# Patient Record
Sex: MALE | Race: WHITE | NOT HISPANIC OR LATINO | Employment: OTHER | URBAN - METROPOLITAN AREA
[De-identification: names, ages, dates, MRNs, and addresses within clinical notes are randomized per-mention and may not be internally consistent; named-entity substitution may affect disease eponyms.]

---

## 2017-01-23 PROBLEM — R00.2 PALPITATIONS: Status: ACTIVE | Noted: 2017-01-23

## 2017-01-23 PROBLEM — I34.1 MITRAL VALVE PROLAPSE: Status: ACTIVE | Noted: 2017-01-23

## 2018-07-25 ENCOUNTER — TELEPHONE (OUTPATIENT)
Dept: ADMINISTRATIVE | Facility: CLINIC | Age: 33
End: 2018-07-25

## 2018-07-25 NOTE — TELEPHONE ENCOUNTER
Home Health Recert 07/02/2018 to 08/30/2018 with [facility] , Dr Darío Duarte.  service.

## 2018-09-18 ENCOUNTER — TELEPHONE (OUTPATIENT)
Dept: ADMINISTRATIVE | Facility: CLINIC | Age: 33
End: 2018-09-18

## 2018-09-18 NOTE — TELEPHONE ENCOUNTER
Home Health Recert with [facility]-Dr. Darío Duarte. Pt received  services.

## 2018-10-30 ENCOUNTER — TELEPHONE (OUTPATIENT)
Dept: ADMINISTRATIVE | Facility: CLINIC | Age: 33
End: 2018-10-30

## 2019-01-03 ENCOUNTER — TELEPHONE (OUTPATIENT)
Dept: ADMINISTRATIVE | Facility: CLINIC | Age: 34
End: 2019-01-03

## 2019-01-03 NOTE — PROGRESS NOTES
HH re certification with [facility], Dr. Darío Duarte. Patient received SN services.

## 2019-07-09 ENCOUNTER — EXTERNAL HOME HEALTH (OUTPATIENT)
Dept: HOME HEALTH SERVICES | Facility: HOSPITAL | Age: 34
End: 2019-07-09

## 2019-07-23 ENCOUNTER — TELEPHONE (OUTPATIENT)
Dept: HEMATOLOGY/ONCOLOGY | Facility: CLINIC | Age: 34
End: 2019-07-23

## 2019-07-24 ENCOUNTER — TELEPHONE (OUTPATIENT)
Dept: HEMATOLOGY/ONCOLOGY | Facility: CLINIC | Age: 34
End: 2019-07-24

## 2019-07-25 ENCOUNTER — TELEPHONE (OUTPATIENT)
Dept: HEMATOLOGY/ONCOLOGY | Facility: CLINIC | Age: 34
End: 2019-07-25

## 2019-07-25 NOTE — TELEPHONE ENCOUNTER
Informed pt mother of appt in [facility] on 9/9. Pt mother states pt needs to be seen sooner, and they would prefer to be seen in [place]. New appt made, mother confirmed.     ----- Message from Marielena Nelson sent at 7/25/2019  4:13 PM CDT -----  Contact: Mother Tori - [phone]  Patient is requesting a call back regarding, his appointment. Please advise

## 2019-07-29 ENCOUNTER — TELEPHONE (OUTPATIENT)
Dept: GASTROENTEROLOGY | Facility: CLINIC | Age: 34
End: 2019-07-29

## 2019-07-29 NOTE — TELEPHONE ENCOUNTER
Ma spoke to pt mother informed pt mother per Dr. Leroy message pt need to be seen by general surgery clinic in case any adjustments need to be made to J tube.     Pt mother verbalize understanding and thank Ma     ----- Message from Jose Leroy MD sent at 7/29/2019 10:19 AM CDT -----  Contact: Pt.'s Mom  [phone]  Probably should see a doc first before seeing Heather.  I'd say if it is a J tube it should still be seen in the general surgery clinic in case any adjustments need to be made to it  ----- Message -----  From: Kiesha Ross MA  Sent: 7/29/2019  10:06 AM  To: Jose Leroy MD    Spoke to pt mother,     Pt mother stated J tube is changed at home by her and it just needs to be checked because of black discharge.     Patient can be seen by Heather instead of [facility] ?   ----- Message -----  From: Jose Leroy MD  Sent: 7/29/2019   9:22 AM  To: Kiesha Ross MA    If it is a J tube that was placed by surgery then usually that goes to surgery. We do not change J tubes (jejunal tubes)  ----- Message -----  From: Kiesha Ross MA  Sent: 7/29/2019   9:12 AM  To: Jose Leroy MD    This patient can be seen by [facility] correct?   ----- Message -----  From: Jeny Mcintosh  Sent: 7/29/2019   9:00 AM  To: [facility] Staff    Patient Requesting Sooner Appointment.     Reason for sooner appt.:  History of jejunostomy tube placement (referral in epic)     When is the first available appointment?  Schedule not available @ time of call     Communication Preference:  Pt.'s Mom  [phone]    Additional Information:    Thank You

## 2019-07-31 ENCOUNTER — TELEPHONE (OUTPATIENT)
Dept: HEMATOLOGY/ONCOLOGY | Facility: CLINIC | Age: 34
End: 2019-07-31

## 2019-07-31 NOTE — TELEPHONE ENCOUNTER
appt confirmed    ----- Message from Francisco Ross sent at 7/31/2019  9:06 AM CDT -----  Contact: Tori (wife)/[phone]  Patient's mother called to find out exactly what tomorrow's appointment will include as the patient is in a wheelchair so they like to be prepared for daily activities.    Please call [phone] to discuss today.

## 2019-08-01 ENCOUNTER — INITIAL CONSULT (OUTPATIENT)
Dept: HEMATOLOGY/ONCOLOGY | Facility: CLINIC | Age: 34
End: 2019-08-01
Payer: MEDICAID

## 2019-08-01 VITALS
SYSTOLIC BLOOD PRESSURE: 118 MMHG | TEMPERATURE: 98 F | DIASTOLIC BLOOD PRESSURE: 76 MMHG | RESPIRATION RATE: 16 BRPM | OXYGEN SATURATION: 96 % | HEART RATE: 87 BPM

## 2019-08-01 DIAGNOSIS — D75.839 THROMBOCYTOSIS: Primary | ICD-10-CM

## 2019-08-01 DIAGNOSIS — G71.09 MUSCULAR DYSTROPHY, CONGENITAL: ICD-10-CM

## 2019-08-01 PROCEDURE — 99999 PR PBB SHADOW E&M-EST. PATIENT-LVL III: CPT | Mod: PBBFAC,,, | Performed by: INTERNAL MEDICINE

## 2019-08-01 PROCEDURE — 99204 OFFICE O/P NEW MOD 45 MIN: CPT | Mod: S$PBB,,, | Performed by: INTERNAL MEDICINE

## 2019-08-01 PROCEDURE — 99204 PR OFFICE/OUTPT VISIT, NEW, LEVL IV, 45-59 MIN: ICD-10-PCS | Mod: S$PBB,,, | Performed by: INTERNAL MEDICINE

## 2019-08-01 PROCEDURE — 99999 PR PBB SHADOW E&M-EST. PATIENT-LVL III: ICD-10-PCS | Mod: PBBFAC,,, | Performed by: INTERNAL MEDICINE

## 2019-08-01 PROCEDURE — 99213 OFFICE O/P EST LOW 20 MIN: CPT | Mod: PBBFAC,PO | Performed by: INTERNAL MEDICINE

## 2019-08-01 NOTE — PROGRESS NOTES
PATIENT: Adrien Griggs  MRN: [id number]  DATE: 8/1/2019    Diagnosis:   1. Thrombocytosis    2. Muscular dystrophy, congenital      Chief Complaint: esinopilia    Subjective:     History of Present Illness:     32 yo male accompanied by his mother for eval of elevated platelet count.    He has congenital muscular dystrophy and is chronically vent dependant.    He is awake and alert.    Labs reviewed.    Component Platelets   Latest Ref Rng & Units 150 - 350 K/uL   7/1/2019 419 (H)   6/7/2019 372 (H)   12/4/2018 381 (H)   12/14/2017 305   2/8/2017 366 (H)   6/17/2016 446 (H)       Past Medical History:   Past Medical History:   Diagnosis Date    Congenital muscular dystrophy     Liver hemangioma     Sleep apnea     Ventilator dependent        Past Surgical History:   Past Surgical History:   Procedure Laterality Date    BACK SURGERY Bilateral     rubi placed at age 8     BRONCHOSCOPY N/A 4/21/2015    Performed [facility] at [facility]    GASTRIC FUNDOPLICATION      MUSCLE BIOPSY      at 8 months old    PEG W/TRACHEOSTOMY PLACEMENT      at age 10    TRACHEOSTOMY TUBE PLACEMENT      at age 10       Family History:   Family History   Problem Relation Age of Onset    Thyroid disease Mother     No Known Problems Father     No Known Problems Brother        Social History:  reports that he has never smoked. He has never used smokeless tobacco. He reports that he does not drink alcohol or use drugs.    Allergies:  Review of patient's allergies indicates:  No Known Allergies    Medications:  Current Outpatient Medications   Medication Sig Dispense Refill    albuterol (ACCUNEB) 0.63 mg/3 mL Nebu Inhale 1 ampule into the lungs.      albuterol (PROVENTIL) 2.5 mg /3 mL (0.083 %) nebulizer solution USE 1 VIAL IN NEBULIZER EVERY SIX HOURS AS NEEDED FOR WHEEZING AS DIRECTED. THANK YOU! **NO REFILL** 360 mL 2    albuterol (PROVENTIL) 5 mg/mL nebulizer solution USE 0.5ML IN NEBULIZER TWICE DAILY UP  TO FOUR TIMES DAILY AS NEEDED 40 mL 4    BACTROBAN 2 % cream Apply topically 2 (two) times daily as needed. 30 g 3    chlorhexidine (PERIDEX) 0.12 % solution RINSE WITH 15 ML FOR 30 SECONDS TWICE DAILY UNTIL COMPLETE . SPIT. DO NOT SWALLOW  2    ibuprofen (ADVIL,MOTRIN) 100 mg/5 mL suspension Take by mouth every 6 (six) hours as needed for Temperature greater than.      SODIUM CHLORIDE FOR INHALATION (SODIUM CHLORIDE 0.9%) 0.9 % nebulizer solution USE 1 VIAL IN NEBULIZER FOUR TIMES A DAY AS NEEDED AS DIRECTED. THANK YOU! 100 mL 4    ZINC OXIDE (BOUDREAUXS BUTT PASTE TOP) Apply topically once daily. Using on feeding tube area       No current facility-administered medications for this visit.        Review of Systems  CONSTITUTIONAL: Weight stable. Appetite good. No fevers.  RESPIRATORY: No cough or congestion.  CARDIOVASCULAR: No chest pain or difficulty breathing.  GASTROINTESTINAL: No abdominal pain or nausea. No change in bowel habits.    Objective:     Vitals:    08/01/19 0907   BP: 118/76   Pulse: 87   Resp: 16   Temp: 97.6 °F (36.4 °C)   SpO2: 96%     BMI: There is no height or weight on file to calculate BMI.    Physical Exam  General appearance: no acute distress. conversant  Neck: no masses or enlarged lymph nodes. Has trach.  Lungs: clear to auscultation. no rales. breathing nonlabored  Heart: rhythm regular. no gallops  Neurologic: oriented to time, place and person.    Assessment:       1. Thrombocytosis    2. Muscular dystrophy, congenital      Plan:   Reviewed recent and prior labs extensively with patient and mother.    Thrombocytosis is mild, chronic and stable and not progressive.    Likely etiology is mild chronic inflammation secondary to chronic tracheostomy.    Reassured patient and mother.  Did not recommend any further workup.    Recommended yearly CBCs.    Further evaluation needed if platelet count worsens and goes over 600,000 in the progressive manner.    Also reviewed mild  eosinophilia, reassured    rtc p.r.n.

## 2019-09-11 ENCOUNTER — EXTERNAL HOME HEALTH (OUTPATIENT)
Dept: HOME HEALTH SERVICES | Facility: HOSPITAL | Age: 34
End: 2019-09-11

## 2019-11-11 ENCOUNTER — EXTERNAL HOME HEALTH (OUTPATIENT)
Dept: HOME HEALTH SERVICES | Facility: HOSPITAL | Age: 34
End: 2019-11-11

## 2020-01-02 ENCOUNTER — EXTERNAL HOME HEALTH (OUTPATIENT)
Dept: HOME HEALTH SERVICES | Facility: HOSPITAL | Age: 35
End: 2020-01-02

## 2020-03-06 ENCOUNTER — EXTERNAL HOME HEALTH (OUTPATIENT)
Dept: HOME HEALTH SERVICES | Facility: HOSPITAL | Age: 35
End: 2020-03-06

## 2020-05-20 ENCOUNTER — EXTERNAL HOME HEALTH (OUTPATIENT)
Dept: HOME HEALTH SERVICES | Facility: HOSPITAL | Age: 35
End: 2020-05-20

## 2020-06-26 ENCOUNTER — EXTERNAL HOME HEALTH (OUTPATIENT)
Dept: HOME HEALTH SERVICES | Facility: HOSPITAL | Age: 35
End: 2020-06-26

## 2020-08-21 ENCOUNTER — DOCUMENT SCAN (OUTPATIENT)
Dept: HOME HEALTH SERVICES | Facility: HOSPITAL | Age: 35
End: 2020-08-21

## 2020-08-27 ENCOUNTER — EXTERNAL HOME HEALTH (OUTPATIENT)
Dept: HOME HEALTH SERVICES | Facility: HOSPITAL | Age: 35
End: 2020-08-27

## 2021-05-06 ENCOUNTER — PATIENT MESSAGE (OUTPATIENT)
Dept: RESEARCH | Facility: HOSPITAL | Age: 36
End: 2021-05-06

## 2021-05-10 ENCOUNTER — PATIENT MESSAGE (OUTPATIENT)
Dept: RESEARCH | Facility: HOSPITAL | Age: 36
End: 2021-05-10

## 2024-01-09 NOTE — TELEPHONE ENCOUNTER
Home Health Recert 10/30/2018 - 12/28/2018 with [facility] - Dr. Darío Duarte. Patient received  services.   PA requsted via [facility] by online w/clinicals.  Reference # [id number]